# Patient Record
Sex: MALE | URBAN - METROPOLITAN AREA
[De-identification: names, ages, dates, MRNs, and addresses within clinical notes are randomized per-mention and may not be internally consistent; named-entity substitution may affect disease eponyms.]

---

## 2020-06-23 ENCOUNTER — OFFICE VISIT (OUTPATIENT)
Dept: URBAN - METROPOLITAN AREA CLINIC 33 | Facility: CLINIC | Age: 55
End: 2020-06-23
Payer: COMMERCIAL

## 2020-06-23 DIAGNOSIS — H43.822 VITREOMACULAR ADHESION, LEFT EYE: Primary | ICD-10-CM

## 2020-06-23 PROCEDURE — 92134 CPTRZ OPH DX IMG PST SGM RTA: CPT | Performed by: OPHTHALMOLOGY

## 2020-06-23 PROCEDURE — 99204 OFFICE O/P NEW MOD 45 MIN: CPT | Performed by: OPHTHALMOLOGY

## 2020-06-23 ASSESSMENT — INTRAOCULAR PRESSURE
OD: 15
OS: 16

## 2020-06-23 NOTE — IMPRESSION/PLAN
Impression: Vitreomacular adhesion, left eye: H43.822. Plan: OCT ordered and performed today. Discussed diagnosis with patient. The clinical exam and OCT are consistent with Vitreomacular traction. About 50% of these cases will spontaneously resolve as the vitreous separates from the retina. Occasionally, the traction will progress to a full thickness macular hole. Recommend observation for a spontaneous resolution at this time. However, we may consider vitrectomy in the future if there is no improvement. Patient agrees with plan.

## 2020-09-22 ENCOUNTER — OFFICE VISIT (OUTPATIENT)
Dept: URBAN - METROPOLITAN AREA CLINIC 33 | Facility: CLINIC | Age: 55
End: 2020-09-22
Payer: COMMERCIAL

## 2020-09-22 PROCEDURE — 99213 OFFICE O/P EST LOW 20 MIN: CPT | Performed by: OPHTHALMOLOGY

## 2020-09-22 PROCEDURE — 92134 CPTRZ OPH DX IMG PST SGM RTA: CPT | Performed by: OPHTHALMOLOGY

## 2020-09-22 ASSESSMENT — INTRAOCULAR PRESSURE
OD: 14
OS: 15